# Patient Record
Sex: MALE | URBAN - METROPOLITAN AREA
[De-identification: names, ages, dates, MRNs, and addresses within clinical notes are randomized per-mention and may not be internally consistent; named-entity substitution may affect disease eponyms.]

---

## 2021-03-24 ENCOUNTER — APPOINTMENT (OUTPATIENT)
Dept: URBAN - METROPOLITAN AREA CLINIC 319 | Age: 76
Setting detail: DERMATOLOGY
End: 2021-03-24

## 2021-03-24 DIAGNOSIS — L72.0 EPIDERMAL CYST: ICD-10-CM

## 2021-03-24 DIAGNOSIS — D485 NEOPLASM OF UNCERTAIN BEHAVIOR OF SKIN: ICD-10-CM

## 2021-03-24 PROBLEM — D48.5 NEOPLASM OF UNCERTAIN BEHAVIOR OF SKIN: Status: ACTIVE | Noted: 2021-03-24

## 2021-03-24 PROCEDURE — OTHER PHOTO-DOCUMENTATION: OTHER

## 2021-03-24 PROCEDURE — OTHER CONSULTATION EXCISION: OTHER

## 2021-03-24 PROCEDURE — 10060 I&D ABSCESS SIMPLE/SINGLE: CPT

## 2021-03-24 PROCEDURE — OTHER PRESCRIPTION: OTHER

## 2021-03-24 PROCEDURE — 99204 OFFICE O/P NEW MOD 45 MIN: CPT | Mod: 25

## 2021-03-24 PROCEDURE — OTHER TREATMENT REGIMEN: OTHER

## 2021-03-24 PROCEDURE — OTHER COUNSELING: OTHER

## 2021-03-24 PROCEDURE — OTHER ADDITIONAL NOTES: OTHER

## 2021-03-24 PROCEDURE — OTHER ORDER TESTS: OTHER

## 2021-03-24 PROCEDURE — OTHER PRESCRIPTION MEDICATION MANAGEMENT: OTHER

## 2021-03-24 PROCEDURE — OTHER INCISION AND DRAINAGE: OTHER

## 2021-03-24 RX ORDER — MUPIROCIN 20 MG/G
OINTMENT TOPICAL
Qty: 1 | Refills: 1 | Status: ERX | COMMUNITY
Start: 2021-03-24

## 2021-03-24 RX ORDER — SULFAMETHOXAZOLE AND TRIMETHOPRIM 800; 160 MG/1; MG/1
TABLET ORAL
Qty: 20 | Refills: 1 | Status: ERX | COMMUNITY
Start: 2021-03-24

## 2021-03-24 ASSESSMENT — LOCATION DETAILED DESCRIPTION DERM
LOCATION DETAILED: LEFT MEDIAL UPPER BACK
LOCATION DETAILED: LEFT SUPERIOR UPPER BACK

## 2021-03-24 ASSESSMENT — LOCATION SIMPLE DESCRIPTION DERM: LOCATION SIMPLE: LEFT UPPER BACK

## 2021-03-24 ASSESSMENT — LOCATION ZONE DERM: LOCATION ZONE: TRUNK

## 2021-03-24 NOTE — PROCEDURE: ADDITIONAL NOTES
Detail Level: Simple
Additional Notes: We will be sure inflamed EIC treated today is well resolved before scheduling for excision of other 2 areas.  Patient has been counseled on excision and we will follow up in 1 week for recheck to ensure area is healing well.
Render Risk Assessment In Note?: no

## 2021-03-24 NOTE — PROCEDURE: INCISION AND DRAINAGE
Epidermal Sutures: 4-0 Ethilon
Curette Text (Optional): After the contents were expressed a curette was used to partially remove the cyst wall.
Curette: No
dressing with hypoallergenic tape
Anesthesia Volume In Cc: 1
Post-Care Instructions: I reviewed with the patient in detail post-care instructions. Patient should keep wound covered and call the office should any redness, pain, swelling or worsening occur.
Method: 11 blade
Anesthesia Type: 1% lidocaine without epinephrine
Lesion Type: Cyst
Epidermal Closure: simple interrupted
Size Of Lesion In Cm (Optional But May Be Required For Some Insurances): 0
Preparation Text: The area was prepped in the usual clean fashion.
Detail Level: Detailed
Consent was obtained and risks were reviewed including but not limited to delayed wound healing, infection, need for multiple I and D's, and pain.
Wound Care: Vaseline

## 2021-03-24 NOTE — PROCEDURE: TREATMENT REGIMEN
Detail Level: Detailed
Plan: We will schedule patient with: Giorgi VILA
Plan: Keep area clean with warm soap and water\\n\\nApply warm compresses to the area 2-3 times per day and allow for additional drainage, as warmth will loosen up material not expressed during the incision and drainage procedure.  If the lesion was too firm to be drained, applying warmth should aide in softening the material making drainage at your follow up visit easier and expedite the healing process.  If the lesion softens and drains on its own, allow it to do so, but do not manipulate the lesion as this can cause additional inflammation. \\n\\nWarm compresses can be made by wetting a washcloth, wringing it out and placing it in a microwave for 30 seconds.  Be careful to not burn yourself in the process, the cloth will be hot. \\n\\nIt is OK to allow clean warm water to run over the area (ex. shower), however do not submerge the area if open, such as in a bathtub, pool, ocean or other body of water. \\n\\nIf the lesion has been opened, keep covered with a clean, loose dressing to absorb any additional drainage that may occur.  Be careful not to apply this bandage too tight as this can occlude the lesion and inhibit drainage.  \\n\\nIf the lesion has packing placed, remove approx 1-2cm daily.  Packing is placed primarily to keep the incision site open to allow drainage, and to delay any premature closure which can slow healing.  If this packing is accidentally pulled out, no need to worry, but do NOT attempt to re-insert packing into wound.\\n\\nIf prescribed, take antibiotics as directed.  Many inflamed cysts are not infected and do NOT require antibiotics.

## 2021-03-31 ENCOUNTER — APPOINTMENT (OUTPATIENT)
Dept: URBAN - METROPOLITAN AREA CLINIC 319 | Age: 76
Setting detail: DERMATOLOGY
End: 2021-03-31

## 2021-03-31 DIAGNOSIS — D485 NEOPLASM OF UNCERTAIN BEHAVIOR OF SKIN: ICD-10-CM

## 2021-03-31 PROBLEM — D48.5 NEOPLASM OF UNCERTAIN BEHAVIOR OF SKIN: Status: ACTIVE | Noted: 2021-03-31

## 2021-03-31 PROCEDURE — OTHER TREATMENT REGIMEN: OTHER

## 2021-03-31 PROCEDURE — OTHER LAB REPORTS REVIEWED: OTHER

## 2021-03-31 PROCEDURE — OTHER CONSULTATION EXCISION: OTHER

## 2021-03-31 PROCEDURE — OTHER COUNSELING: OTHER

## 2021-03-31 PROCEDURE — 99213 OFFICE O/P EST LOW 20 MIN: CPT | Mod: 24

## 2021-03-31 PROCEDURE — OTHER PHOTO-DOCUMENTATION: OTHER

## 2021-03-31 ASSESSMENT — LOCATION DETAILED DESCRIPTION DERM
LOCATION DETAILED: RIGHT SUPERIOR UPPER BACK
LOCATION DETAILED: LEFT SUPERIOR LATERAL UPPER BACK
LOCATION DETAILED: RIGHT MEDIAL UPPER BACK

## 2021-03-31 ASSESSMENT — LOCATION SIMPLE DESCRIPTION DERM
LOCATION SIMPLE: LEFT UPPER BACK
LOCATION SIMPLE: RIGHT UPPER BACK

## 2021-03-31 ASSESSMENT — LOCATION ZONE DERM: LOCATION ZONE: TRUNK

## 2024-04-06 NOTE — PROCEDURE: PHOTO-DOCUMENTATION
Detail Level: Zone
Photo Preface (Leave Blank If You Do Not Want): Photographs were obtained today
No